# Patient Record
Sex: FEMALE | Race: ASIAN | Employment: STUDENT | URBAN - METROPOLITAN AREA
[De-identification: names, ages, dates, MRNs, and addresses within clinical notes are randomized per-mention and may not be internally consistent; named-entity substitution may affect disease eponyms.]

---

## 2019-04-20 ENCOUNTER — HOSPITAL ENCOUNTER (EMERGENCY)
Facility: CLINIC | Age: 19
Discharge: HOME | End: 2019-04-20
Attending: EMERGENCY MEDICINE | Admitting: EMERGENCY MEDICINE
Payer: COMMERCIAL

## 2019-04-20 VITALS
OXYGEN SATURATION: 97 % | DIASTOLIC BLOOD PRESSURE: 126 MMHG | RESPIRATION RATE: 16 BRPM | HEIGHT: 68 IN | BODY MASS INDEX: 26.58 KG/M2 | TEMPERATURE: 98.8 F | WEIGHT: 175.4 LBS | HEART RATE: 85 BPM | SYSTOLIC BLOOD PRESSURE: 175 MMHG

## 2019-04-20 DIAGNOSIS — J06.9 UPPER RESPIRATORY TRACT INFECTION, UNSPECIFIED TYPE: ICD-10-CM

## 2019-04-20 DIAGNOSIS — H11.32 SUBCONJUNCTIVAL HEMORRHAGE, NON-TRAUMATIC, LEFT: Primary | ICD-10-CM

## 2019-04-20 DIAGNOSIS — I10 CHRONIC HYPERTENSION: ICD-10-CM

## 2019-04-20 PROCEDURE — 93005 ELECTROCARDIOGRAM TRACING: CPT

## 2019-04-20 PROCEDURE — 93010 ELECTROCARDIOGRAM REPORT: CPT | Mod: Z6 | Performed by: EMERGENCY MEDICINE

## 2019-04-20 PROCEDURE — 99284 EMERGENCY DEPT VISIT MOD MDM: CPT | Mod: 25 | Performed by: EMERGENCY MEDICINE

## 2019-04-20 PROCEDURE — 25000132 ZZH RX MED GY IP 250 OP 250 PS 637: Performed by: EMERGENCY MEDICINE

## 2019-04-20 PROCEDURE — 94640 AIRWAY INHALATION TREATMENT: CPT

## 2019-04-20 PROCEDURE — 99284 EMERGENCY DEPT VISIT MOD MDM: CPT | Mod: 25

## 2019-04-20 PROCEDURE — 25000125 ZZHC RX 250: Performed by: EMERGENCY MEDICINE

## 2019-04-20 RX ORDER — ALBUTEROL SULFATE 90 UG/1
2 AEROSOL, METERED RESPIRATORY (INHALATION) ONCE
Status: COMPLETED | OUTPATIENT
Start: 2019-04-20 | End: 2019-04-20

## 2019-04-20 RX ORDER — IPRATROPIUM BROMIDE AND ALBUTEROL SULFATE 2.5; .5 MG/3ML; MG/3ML
3 SOLUTION RESPIRATORY (INHALATION) ONCE
Status: COMPLETED | OUTPATIENT
Start: 2019-04-20 | End: 2019-04-20

## 2019-04-20 RX ADMIN — ALBUTEROL SULFATE 2 PUFF: 90 AEROSOL, METERED RESPIRATORY (INHALATION) at 16:29

## 2019-04-20 RX ADMIN — IPRATROPIUM BROMIDE AND ALBUTEROL SULFATE 3 ML: .5; 3 SOLUTION RESPIRATORY (INHALATION) at 15:27

## 2019-04-20 SDOH — HEALTH STABILITY: MENTAL HEALTH: HOW OFTEN DO YOU HAVE A DRINK CONTAINING ALCOHOL?: NEVER

## 2019-04-20 ASSESSMENT — ENCOUNTER SYMPTOMS
COUGH: 1
FEVER: 1
SHORTNESS OF BREATH: 0
EYE PAIN: 0

## 2019-04-20 ASSESSMENT — MIFFLIN-ST. JEOR: SCORE: 1619.11

## 2019-04-20 NOTE — ED TRIAGE NOTES
Patient presents with left eye pain and redness. Reports having cold symptoms. Reports coughing this morning very strongly and bursting a blood vessel in her right eye. Hypertensive in triage 180/128.

## 2019-04-20 NOTE — ED PROVIDER NOTES
"    [facility] ([facility])  4/20/19    History     Chief Complaint   Patient presents with     Eye Problem     The history is provided by the patient and a friend.     Mansoor Cantrell is a 18 year old female with a past medical history significant for HTN and childhood asthma who presents to the Emergency Department due to a \"popped blood vessel\" in her left eye. Patient reports having a recent respiratory infection. She has improved with resolution of fevers, but has persistent cough. She states that she was coughing very hard and this caused a blood vessel in her eye to burst. Patient states that she was previously on BP medications but ran out 6 months ago. She has a family h/o HTN but does not know the cause for her high blood pressure. She is normally managed at [facility]. Patient denies being on anticoagulates. Patient currently denies vision changes, left eye pain, headache, shortness of breath, or chest pain.     I have reviewed the Medications, Allergies, Past Medical and Surgical History, and Social History in the [employer] system.    Past Medical History:   Diagnosis Date     Hypertension        No past surgical history on file.    No family history on file.    Social History     Tobacco Use     Smoking status: Never Smoker   Substance Use Topics     Alcohol use: Never     Frequency: Never       No current facility-administered medications for this encounter.      No current outpatient medications on file.      No Known Allergies      Review of Systems   Constitutional: Positive for fever (Resolved).   Eyes: Negative for pain and visual disturbance.        Positive for popped blood vessel in left eye   Respiratory: Positive for cough. Negative for shortness of breath.    Cardiovascular: Negative for chest pain.   Neurological: Positive for headaches (on and off over the past week).   All other systems reviewed and are negative.      Physical Exam   BP: (!) 180/128  Pulse: 103  Temp: " "98.8  F (37.1  C)  Resp: 16  Height: 172.7 cm (5' 8\")  Weight: 79.6 kg (175 lb 6.4 oz)  SpO2: 98 %      Physical Exam   Constitutional: She is oriented to person, place, and time. She appears well-developed and well-nourished. No distress.   HENT:   Head: Normocephalic and atraumatic.   Nose: Nose normal.   Mouth/Throat: Oropharynx is clear and moist. No oropharyngeal exudate.   Eyes: Pupils are equal, round, and reactive to light. EOM are normal. Right eye exhibits no chemosis, no discharge and no exudate. No foreign body present in the right eye. Left eye exhibits no chemosis, no discharge and no exudate. No foreign body present in the left eye. Right conjunctiva has no hemorrhage. Left conjunctiva has a hemorrhage. No scleral icterus.   Neck: Normal range of motion. Neck supple.   Cardiovascular: Regular rhythm, normal heart sounds and intact distal pulses. Tachycardia present.   No murmur heard.  Pulmonary/Chest: Effort normal. No stridor. No respiratory distress. She has decreased breath sounds. She has no wheezes. She has no rhonchi. She has no rales.   Prolonged expiratory phase. Irritable dry cough   Abdominal: Soft. She exhibits no distension. There is no tenderness.   Musculoskeletal: Normal range of motion. She exhibits no deformity.   Neurological: She is alert and oriented to person, place, and time.   Skin: Skin is warm and dry. No rash noted. She is not diaphoretic. No erythema. No pallor.   Psychiatric: She has a normal mood and affect. Her behavior is normal.   Nursing note and vitals reviewed.      ED Course   2:05 PM  The patient was seen and examined by Amanda Griffith MD in Room [facility].        Procedures             EKG Interpretation:      Interpreted by Amanda Griffith MD  Time reviewed: 14:32  Symptoms at time of EKG: HTN, tachycardia, cough   Rhythm: normal sinus   Rate: 86 BPM  Axis: Normal  Ectopy: none  Conduction: normal  ST Segments/ T Waves: No ST-T wave changes and No acute ischemic changes  Q " Waves: none  Comparison to prior: No old EKG available    Clinical Impression: normal EKG                Critical Care time:  none             Labs Ordered and Resulted from Time of ED Arrival Up to the Time of Departure from the ED - No data to display         Assessments & Plan (with Medical Decision Making)   Mansoor Cantrell is a 18 year old female with a past medical history significant for HTN and asthma who presents to the Emergency Department due to a popped blood vessel in her left eye.     Ddx: subconjunctival hemorrhage, hypertensive emergency, URI, PNA, asthma exacerbation    Patient hypertensive reporting noncompliance with chronic meds. Denies CP or sob but has had residual cough from recent respiratory infection. No wheezing but does have irritable cough and prolonged expiratory phase. H/o asthma. Cough improved after duoneb. Given albuterol hfa for home to help minimize cough and allow subconjunctival hemorrhage to heal. Vision wnl. No HA currently. Not on blood thinners. Advised conservative management: avoid strenuous activity, minimize coughing with inhaler, avoid NSAIDs. Advised cxr and basic labs to screen for PNA, kidney dysfunction, anemia, other secondary cause for HTN. Patient declined as she was afraid her insurance would not cover the tests. She prefers to follow up with [facility]. Advised her to fill her BP med refills and resume taking them. F/u with PCP for further management and work up of HTN. Patient understood the importance of maintaining follow up to discuss HTN.     I have reviewed the nursing notes.    I have reviewed the findings, diagnosis, plan and need for follow up with the patient.       Medication List      There are no discharge medications for this visit.         Final diagnoses:   Chronic hypertension   Upper respiratory tract infection, unspecified type   Subconjunctival hemorrhage, non-traumatic, left     Timmy SOSA, am serving as a trained medical scribe to document  services personally performed by Amanda Griffith MD, based on the provider's statements to me.   I, Amanda Griffith MD, was physically present and have reviewed and verified the accuracy of this note documented by Timmy Luz.    4/20/2019   [facility], [place], EMERGENCY DEPARTMENT     Amanda Griffith MD  04/22/19 0056

## 2019-04-20 NOTE — ED AVS SNAPSHOT
[facility], [place], Emergency Department  [address] [place] [id number]  Phone:  [phone]                                    Mansoor Cantrell   MRN: [id number]    Department:  [facility], Emergency Department   Date of Visit:  4/20/2019           After Visit Summary Signature Page    I have received my discharge instructions, and my questions have been answered. I have discussed any challenges I see with this plan with the nurse or doctor.    ..........................................................................................................................................  Patient/Patient Representative Signature      ..........................................................................................................................................  Patient Representative Print Name and Relationship to Patient    ..................................................               ................................................  Date                                   Time    ..........................................................................................................................................  Reviewed by Signature/Title    ...................................................              ..............................................  Date                                               Time          22EPIC Rev 08/18

## 2019-04-20 NOTE — DISCHARGE INSTRUCTIONS
Please make an appointment to follow up with [facility] (phone: [phone]) as soon as possible for reevaluation and management of your hypertension.    Refill your blood pressure medications.    Use albuterol inhaler, as needed to help with coughing.    Avoid NSAIDS (ibuprofen, aspirin, etc.) or other medications that can worsen bleeding, until your eye has healed. Avoid strenuous activity. Try to avoid coughing or other activities that could increase the pressure in your eye. Once symptoms have resolved, you may resume normal activity.     Return to the emergency department for worsening cough, shortness of breath, fever, headache, vision changes, chest pain.

## 2019-04-21 LAB — INTERPRETATION ECG - MUSE: NORMAL

## 2019-04-22 ASSESSMENT — ENCOUNTER SYMPTOMS: HEADACHES: 1
